# Patient Record
Sex: MALE | Race: WHITE | NOT HISPANIC OR LATINO | Employment: STUDENT | URBAN - METROPOLITAN AREA
[De-identification: names, ages, dates, MRNs, and addresses within clinical notes are randomized per-mention and may not be internally consistent; named-entity substitution may affect disease eponyms.]

---
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
## 2025-01-13 ENCOUNTER — TELEMEDICINE (OUTPATIENT)
Dept: PRIMARY CARE | Facility: CLINIC | Age: 20
End: 2025-01-13
Payer: COMMERCIAL

## 2025-01-13 ENCOUNTER — APPOINTMENT (OUTPATIENT)
Dept: PRIMARY CARE | Facility: CLINIC | Age: 20
End: 2025-01-13
Payer: COMMERCIAL

## 2025-01-13 DIAGNOSIS — R50.9 FEVER, UNKNOWN ORIGIN: Primary | ICD-10-CM

## 2025-01-13 PROCEDURE — 99213 OFFICE O/P EST LOW 20 MIN: CPT | Performed by: NURSE PRACTITIONER

## 2025-01-13 ASSESSMENT — ENCOUNTER SYMPTOMS
SHORTNESS OF BREATH: 0
CHEST TIGHTNESS: 0

## 2025-01-13 NOTE — PROGRESS NOTES
"Subjective   Patient ID: Zachary Patrick Hoenigman is a 19 y.o. male who presents for Fever.    Fever  Cough/wheezing recently patient reports improved today   Used inhaler, h/o asthma  Fever Tmax 104.2  Felt like his HR was high, HR was up, he could feel his HR going up  Cough, lightheaded occasionally today   Feels great no idea what is causing the fever.   Denies back pain, rib pain, HA, ear pain, sore throat  Denies N/V/D  Denies urinary SX  Reports sister recently had \"the flu\"  Temp currently 100.5  Follows with pediatric cardiology due to Mild pulmonary valve stenosis  Secundum ASD vs PFO - closed  Vasodepressor syncope, resolved   Last seen on 06/2024  Recent testing done including echo and holter monitor         Review of Systems   HENT: Negative.     Respiratory:  Negative for chest tightness and shortness of breath.        Objective   There were no vitals taken for this visit.    Physical Exam  Constitutional:       General: He is not in acute distress.     Appearance: Normal appearance. He is normal weight. He is not ill-appearing.      Comments: On Demand Virtual Visit Patient Consent     An interactive audio and video telecommunication system which permits real time communications between the patient (at the originating site) and provider (at the distant site) was utilized to provide this telehealth service.   Verbal consent was requested and obtained from Zachary Patrick Hoenigman (or parent if under 18) on this date, 03/27/24 for a telehealth visit.   I have verbally confirmed with Zachary Patrick Hoenigman (or parent if under 18) that they are physically located in the [facility] during this virtual visit.    I performed this visit using realtime telehealth tools, including an audio/video OR telephone connection between the patient listed who was located in the [facility] and myself, Javi Garcia CNP (licensed in the [facility]).  At the start of the visit, I introduced myself as Javi Garcia, " Nurse practitioner and verified the patients name, , and current physical location.    If they were currently outside of the state of [place], the visit was ended and the patient was referred to alternative means for evaluation and treatment.   The patient was made aware of the limitations of the telehealth visit.  They will not be physically examined and all issues may not be appropriate for a telehealth visit.  If necessary, an in person referral will be made.       DISCLAIMER:   In preparing for this visit and writing this note, I reviewed previous electronic medical records (labs, imaging and medical charts) available.  Significant findings which helped in decision making are recorded in this encounter charting.     Pulmonary:      Effort: Pulmonary effort is normal.   Skin:     Coloration: Skin is not pale.   Neurological:      Mental Status: He is alert and oriented to person, place, and time.         Assessment/Plan   Diagnoses and all orders for this visit:  Fever, unknown origin  -continue to take Tylenol and ibuprofen fro fever, be sure to drink plenty fluids    Contact Cardiology to notify him of fever  PCP referral placed

## 2025-01-13 NOTE — PATIENT INSTRUCTIONS
Pleasure meeting with you today!    Let me know if you need anything.     Please send me a MyChart message if you have any questions or concerns.  FOR NON URGENT questions only.  Allow up to 72 hours for response.     If you have prescription issues or other questions you can email   Heri Venegas,  Digital Health Coordinator, at   [email or web address]

## 2025-02-15 ENCOUNTER — HOSPITAL ENCOUNTER (EMERGENCY)
Facility: HOSPITAL | Age: 20
Discharge: HOME | End: 2025-02-15
Attending: EMERGENCY MEDICINE
Payer: COMMERCIAL

## 2025-02-15 ENCOUNTER — APPOINTMENT (OUTPATIENT)
Dept: CARDIOLOGY | Facility: HOSPITAL | Age: 20
End: 2025-02-15
Payer: COMMERCIAL

## 2025-02-15 ENCOUNTER — APPOINTMENT (OUTPATIENT)
Dept: RADIOLOGY | Facility: HOSPITAL | Age: 20
End: 2025-02-15
Payer: COMMERCIAL

## 2025-02-15 VITALS
HEIGHT: 72 IN | SYSTOLIC BLOOD PRESSURE: 127 MMHG | BODY MASS INDEX: 25.47 KG/M2 | TEMPERATURE: 97.5 F | HEART RATE: 65 BPM | DIASTOLIC BLOOD PRESSURE: 62 MMHG | OXYGEN SATURATION: 99 % | WEIGHT: 188 LBS | RESPIRATION RATE: 19 BRPM

## 2025-02-15 DIAGNOSIS — R55 SYNCOPE AND COLLAPSE: Primary | ICD-10-CM

## 2025-02-15 DIAGNOSIS — Z86.79 HISTORY OF PULMONARY VALVE STENOSIS: ICD-10-CM

## 2025-02-15 LAB
ALBUMIN SERPL BCP-MCNC: 4.7 G/DL (ref 3.4–5)
ANION GAP SERPL CALC-SCNC: 16 MMOL/L (ref 10–20)
BASOPHILS # BLD AUTO: 0.03 X10*3/UL (ref 0–0.1)
BASOPHILS NFR BLD AUTO: 0.4 %
BNP SERPL-MCNC: 9 PG/ML (ref 0–99)
BUN SERPL-MCNC: 19 MG/DL (ref 6–23)
CALCIUM SERPL-MCNC: 8.9 MG/DL (ref 8.6–10.3)
CARDIAC TROPONIN I PNL SERPL HS: <3 NG/L (ref 0–20)
CHLORIDE SERPL-SCNC: 102 MMOL/L (ref 98–107)
CO2 SERPL-SCNC: 25 MMOL/L (ref 21–32)
CREAT SERPL-MCNC: 0.95 MG/DL (ref 0.5–1.3)
EGFRCR SERPLBLD CKD-EPI 2021: >90 ML/MIN/1.73M*2
EOSINOPHIL # BLD AUTO: 0.26 X10*3/UL (ref 0–0.7)
EOSINOPHIL NFR BLD AUTO: 3.4 %
ERYTHROCYTE [DISTWIDTH] IN BLOOD BY AUTOMATED COUNT: 13.1 % (ref 11.5–14.5)
GLUCOSE SERPL-MCNC: 116 MG/DL (ref 74–99)
HCT VFR BLD AUTO: 46.6 % (ref 41–52)
HGB BLD-MCNC: 15.3 G/DL (ref 13.5–17.5)
IMM GRANULOCYTES # BLD AUTO: 0.01 X10*3/UL (ref 0–0.7)
IMM GRANULOCYTES NFR BLD AUTO: 0.1 % (ref 0–0.9)
LYMPHOCYTES # BLD AUTO: 3.75 X10*3/UL (ref 1.2–4.8)
LYMPHOCYTES NFR BLD AUTO: 48.8 %
MAGNESIUM SERPL-MCNC: 1.87 MG/DL (ref 1.6–2.4)
MCH RBC QN AUTO: 28.9 PG (ref 26–34)
MCHC RBC AUTO-ENTMCNC: 32.8 G/DL (ref 32–36)
MCV RBC AUTO: 88 FL (ref 80–100)
MONOCYTES # BLD AUTO: 0.48 X10*3/UL (ref 0.1–1)
MONOCYTES NFR BLD AUTO: 6.3 %
NEUTROPHILS # BLD AUTO: 3.15 X10*3/UL (ref 1.2–7.7)
NEUTROPHILS NFR BLD AUTO: 41 %
NRBC BLD-RTO: 0 /100 WBCS (ref 0–0)
PHOSPHATE SERPL-MCNC: 3.7 MG/DL (ref 2.5–4.9)
PLATELET # BLD AUTO: 208 X10*3/UL (ref 150–450)
POTASSIUM SERPL-SCNC: 3.9 MMOL/L (ref 3.5–5.3)
RBC # BLD AUTO: 5.3 X10*6/UL (ref 4.5–5.9)
SODIUM SERPL-SCNC: 139 MMOL/L (ref 136–145)
WBC # BLD AUTO: 7.7 X10*3/UL (ref 4.4–11.3)

## 2025-02-15 PROCEDURE — 85025 COMPLETE CBC W/AUTO DIFF WBC: CPT

## 2025-02-15 PROCEDURE — 93005 ELECTROCARDIOGRAM TRACING: CPT

## 2025-02-15 PROCEDURE — 84484 ASSAY OF TROPONIN QUANT: CPT

## 2025-02-15 PROCEDURE — 83880 ASSAY OF NATRIURETIC PEPTIDE: CPT

## 2025-02-15 PROCEDURE — 96360 HYDRATION IV INFUSION INIT: CPT

## 2025-02-15 PROCEDURE — 99284 EMERGENCY DEPT VISIT MOD MDM: CPT | Mod: 25 | Performed by: EMERGENCY MEDICINE

## 2025-02-15 PROCEDURE — 84100 ASSAY OF PHOSPHORUS: CPT

## 2025-02-15 PROCEDURE — 83735 ASSAY OF MAGNESIUM: CPT

## 2025-02-15 PROCEDURE — 2500000004 HC RX 250 GENERAL PHARMACY W/ HCPCS (ALT 636 FOR OP/ED)

## 2025-02-15 PROCEDURE — 36415 COLL VENOUS BLD VENIPUNCTURE: CPT

## 2025-02-15 RX ADMIN — SODIUM CHLORIDE, POTASSIUM CHLORIDE, SODIUM LACTATE AND CALCIUM CHLORIDE 1000 ML: 600; 310; 30; 20 INJECTION, SOLUTION INTRAVENOUS at 11:25

## 2025-02-15 ASSESSMENT — PAIN SCALES - GENERAL
PAINLEVEL_OUTOF10: 0 - NO PAIN
PAINLEVEL_OUTOF10: 0 - NO PAIN

## 2025-02-15 ASSESSMENT — COLUMBIA-SUICIDE SEVERITY RATING SCALE - C-SSRS
2. HAVE YOU ACTUALLY HAD ANY THOUGHTS OF KILLING YOURSELF?: NO
6. HAVE YOU EVER DONE ANYTHING, STARTED TO DO ANYTHING, OR PREPARED TO DO ANYTHING TO END YOUR LIFE?: NO
1. IN THE PAST MONTH, HAVE YOU WISHED YOU WERE DEAD OR WISHED YOU COULD GO TO SLEEP AND NOT WAKE UP?: NO

## 2025-02-15 ASSESSMENT — PAIN - FUNCTIONAL ASSESSMENT
PAIN_FUNCTIONAL_ASSESSMENT: 0-10
PAIN_FUNCTIONAL_ASSESSMENT: 0-10

## 2025-02-15 NOTE — ED PROVIDER NOTES
The patient was seen by the midlevel/resident.  I have personally saw the patient and made/approved the management plan and take responsibility for the patient management.  I reviewed the EKG's (when done) and agree with the interpretation.  I have seen and examined the patient; agree with the workup, evaluation, MDM, and diagnosis.  The care plan has been discussed with the midlevel/resident; I have reviewed the note and agree with the documented findings.     Presents with complaint of syncopal episode.  Patient has been working out a lot and does not think he is drinking of fluids.  He has history of syncope in the past vasovagal.  He was worked up in the ED with labs and imaging.  He is stable at this time getting fluids and feels much better.  Diagnoses as of 02/15/25 1412   Syncope and collapse   History of pulmonary valve stenosis     MD Wellington Ernandez MD  02/15/25 1412

## 2025-02-15 NOTE — ED PROVIDER NOTES
History of Present Illness     History provided by: Patient, Parent, and EMS  Limitations to History: None  External Records Reviewed with Brief Summary:  Outpatient note June 24 from pediatric cardiologist, reported normal stress test and echo at that time    HPI:  Zachary Patrick Hoenigman is a 19 y.o. male past medical history of ASD, pulmonic valve stenosis and previous syncope who presents today for syncope.  Patient states that he woke up and was getting up to go to the bathroom when he began to feel lightheaded and then passed out.  He states he is unsure how long he was down for, but did feel like he was going to pass out before it happened.  He states these havehappened before.  He denies any chest pain or shortness of breath, does endorse that he has been working out a lot recently, and has not been drinking enough water.  He denies any numbness weakness or tingling in his arms or legs, no headaches, changes in vision or hearing.  Denies any difficulty swallowing or speaking.  Denies any recent illnesses, but did have the flu roughly 1 month ago.  He denies any abdominal pain nausea vomiting or diarrhea currently.  No known sick contacts.  He does endorse that he has previously had an echo as well as a stress test within the last 6 months, all of which was normal.    Physical Exam   Triage vitals:  T 36.4 °C (97.5 °F)  HR 88  /65  RR 19  O2 98 % None (Room air)    Physical Exam  Constitutional:       General: He is not in acute distress.     Appearance: Normal appearance. He is not ill-appearing or diaphoretic.   HENT:      Head: Normocephalic and atraumatic.      Mouth/Throat:      Mouth: Mucous membranes are moist.      Pharynx: No oropharyngeal exudate or posterior oropharyngeal erythema.   Eyes:      General: No scleral icterus.     Extraocular Movements: Extraocular movements intact.      Pupils: Pupils are equal, round, and reactive to light.   Cardiovascular:      Rate and Rhythm: Normal  rate and regular rhythm.      Pulses: Normal pulses.      Heart sounds: Normal heart sounds. No murmur heard.     No gallop.   Pulmonary:      Effort: Pulmonary effort is normal. No respiratory distress.      Breath sounds: Normal breath sounds. No stridor. No wheezing, rhonchi or rales.   Abdominal:      General: Bowel sounds are normal. There is no distension.      Palpations: Abdomen is soft. There is no mass.      Tenderness: There is no abdominal tenderness.      Hernia: No hernia is present.   Musculoskeletal:         General: No swelling, deformity or signs of injury. Normal range of motion.      Cervical back: Normal range of motion and neck supple. No tenderness.   Skin:     General: Skin is warm.      Capillary Refill: Capillary refill takes less than 2 seconds.      Findings: No erythema, lesion or rash.   Neurological:      General: No focal deficit present.      Mental Status: He is alert. Mental status is at baseline.   Psychiatric:         Mood and Affect: Mood normal.         Behavior: Behavior normal.        Medical Decision Making & ED Course   Medical Decision Makin y.o. male past medical history of ASD, pulmonic valve stenosis who presents today for syncopal episode.  Patient's episode does sound like vasovagal syncope, particularly in the setting of the patient having this previously and having presyncopal symptoms.  He had no high risk features such as shortness of breath or chest pain that makes me concerned for this being cardiogenic.  However, the patient's history is somewhat concerning for cardiogenic cause.  Given the fact the patient has had reported appropriate testing in the outpatient setting, we will get labs to confirm there is no other potential cause for the patient's syncope such as electrolyte derangement, anemia.  EKG here demonstrates sinus bradycardia with sinus arrhythmia, which is normal for his age.  We will also get troponin as well as BNP.  Patient's labs were  normal, troponin was less than 3, BNP was within normal range.  Patient has no evidence of electrolyte derangement, kidney function within normal limits.  No anemia to suggest reason for syncopal episode.  Discussed results with mom and patient at bedside, comfortable with plan to follow-up with cardiology outpatient.  All questions were answered prior to discharge, return precaution provided.  ----      Differential diagnoses considered include but are not limited to: dehydration, vasovagal syncope, hypomagnesemia     Social Determinants of Health which Significantly Impact Care: None identified     EKG Independent Interpretation:  EKG demonstrates sinus bradycardia with marked sinus arrhythmia with a rate of 55, normal ME QRS and QTc intervals, somewhat elevated ST segments in the anterior and lateral leads consistent with early repolarization, otherwise no evidence of acute ST elevation T wave inversion or ST depression suggestive of MI.  No previous EKGs to compare to.    Independent Result Review and Interpretation: Relevant laboratory and radiographic results were reviewed and independently interpreted by myself.  As necessary, they are commented on in the ED Course.    Chronic conditions affecting the patient's care: As documented above in [facility]    The patient was discussed with the following consultants/services: None    Care Considerations: As documented above in [facility]    ED Course:  Diagnoses as of 02/15/25 1136   Syncope and collapse   History of pulmonary valve stenosis     Disposition   As a result of the work-up, the patient was discharged home.  he was informed of his diagnosis and instructed to come back with any concerns or worsening of condition.  he and was agreeable to the plan as discussed above.  he was given the opportunity to ask questions.  All of the patient's questions were answered.    Procedures   Procedures        Gideon De Jesus MD  Emergency Medicine       Gideon De Jesus,  MD  Resident  02/15/25 0111

## 2025-02-15 NOTE — DISCHARGE INSTRUCTIONS
You were seen today after you passed out, otherwise known as syncopized.  You have a previous history of this, so this is likely vasovagal syncope.  Given your cardiac history, we did get labs, all of which were normal.  I would recommend that you follow-up with your cardiologist, as they may be more concerned and want to run additional tests given her history of pulmonic valve stenosis.  If you begin to have any chest pain or shortness of breath, began to have these episodes more frequently, I would expedite seeing them or return here to the emergency department for further treatment and evaluation.

## 2025-02-17 LAB
ATRIAL RATE: 55 BPM
P AXIS: 65 DEGREES
P OFFSET: 164 MS
P ONSET: 137 MS
PR INTERVAL: 168 MS
Q ONSET: 221 MS
QRS COUNT: 9 BEATS
QRS DURATION: 102 MS
QT INTERVAL: 446 MS
QTC CALCULATION(BAZETT): 426 MS
QTC FREDERICIA: 433 MS
R AXIS: 57 DEGREES
T AXIS: 50 DEGREES
T OFFSET: 444 MS
VENTRICULAR RATE: 55 BPM
